# Patient Record
Sex: MALE | Race: WHITE
[De-identification: names, ages, dates, MRNs, and addresses within clinical notes are randomized per-mention and may not be internally consistent; named-entity substitution may affect disease eponyms.]

---

## 2023-04-11 ENCOUNTER — HOSPITAL ENCOUNTER (OUTPATIENT)
Dept: HOSPITAL 47 - ORWHC2ENDO | Age: 43
Discharge: HOME | End: 2023-04-11
Attending: INTERNAL MEDICINE
Payer: COMMERCIAL

## 2023-04-11 VITALS — SYSTOLIC BLOOD PRESSURE: 142 MMHG | HEART RATE: 81 BPM | DIASTOLIC BLOOD PRESSURE: 86 MMHG

## 2023-04-11 VITALS — TEMPERATURE: 98.2 F | RESPIRATION RATE: 16 BRPM

## 2023-04-11 VITALS — BODY MASS INDEX: 43.5 KG/M2

## 2023-04-11 DIAGNOSIS — K62.5: Primary | ICD-10-CM

## 2023-04-11 DIAGNOSIS — Z79.899: ICD-10-CM

## 2023-04-11 DIAGNOSIS — K64.1: ICD-10-CM

## 2023-04-11 PROCEDURE — 45378 DIAGNOSTIC COLONOSCOPY: CPT

## 2023-04-11 NOTE — P.PCN
Date of Procedure: 04/11/23


Procedure(s) Performed: 


BRIEF HISTORY: Patient is a 42-year-old pleasant male scheduled for an elective 

colonoscopy as a part of evaluation of intermittent rectal bleeding for the last

1 year duration.





PROCEDURE PERFORMED: Colonoscopy. 





PREOPERATIVE DIAGNOSIS: Intermittent rectal bleeding for 1 year duration. 





IV sedation per Anesthesia. 





PROCEDURE: After informed consent was obtained, the patient, was brought into 

the endoscopy unit. IV sedation was administered by Anesthesia under continuous 

monitoring.  Digital rectal examination was normal. Initially the Olympus CF-160

flexible video colonoscope was then inserted in the rectum, gradually advanced 

into the cecum without any difficulty. Careful examination was performed as the 

scope was gradually being withdrawn. Ileocecal valve and the appendiceal orifice

were visualized and appeared normal.  Prep was excellent. Mucosa of the cecum, 

ascending colon, transverse colon, descending colon, sigmoid colon, and rectum 

appeared normal. Retroflexion was performed in the rectum and grade 2 internal 

hemorrhoids were seen. The patient tolerated the procedure well. 





IMPRESSION: 


Normal-appearing colon from rectum to cecum with no evidence of colorectal 

neoplasia .


Grade 2 internal hemorrhoids.





RECOMMENDATIONS:  Findings of this examination were discussed with the patient 

as well as his family.  He was advised to be a high-fiber diet and take fiber 

Supplements on a regular basis and avoid straining and constipation.  Recommend 

repeat colonoscopy in 10 years..